# Patient Record
Sex: MALE | Race: WHITE | NOT HISPANIC OR LATINO | Employment: FULL TIME | URBAN - METROPOLITAN AREA
[De-identification: names, ages, dates, MRNs, and addresses within clinical notes are randomized per-mention and may not be internally consistent; named-entity substitution may affect disease eponyms.]

---

## 2021-12-29 ENCOUNTER — OFFICE VISIT (OUTPATIENT)
Dept: INTERNAL MEDICINE CLINIC | Facility: CLINIC | Age: 36
End: 2021-12-29
Payer: COMMERCIAL

## 2021-12-29 ENCOUNTER — APPOINTMENT (OUTPATIENT)
Dept: LAB | Facility: CLINIC | Age: 36
End: 2021-12-29
Payer: COMMERCIAL

## 2021-12-29 VITALS
OXYGEN SATURATION: 95 % | TEMPERATURE: 97.8 F | HEART RATE: 99 BPM | DIASTOLIC BLOOD PRESSURE: 60 MMHG | HEIGHT: 72 IN | BODY MASS INDEX: 18.53 KG/M2 | WEIGHT: 136.8 LBS | SYSTOLIC BLOOD PRESSURE: 110 MMHG

## 2021-12-29 DIAGNOSIS — Z11.4 ENCOUNTER FOR SCREENING FOR HIV: ICD-10-CM

## 2021-12-29 DIAGNOSIS — M25.511 RIGHT SHOULDER PAIN, UNSPECIFIED CHRONICITY: ICD-10-CM

## 2021-12-29 DIAGNOSIS — Z11.59 ENCOUNTER FOR HEPATITIS C SCREENING TEST FOR LOW RISK PATIENT: ICD-10-CM

## 2021-12-29 DIAGNOSIS — Z13.6 ENCOUNTER FOR LIPID SCREENING FOR CARDIOVASCULAR DISEASE: ICD-10-CM

## 2021-12-29 DIAGNOSIS — E55.9 VITAMIN D DEFICIENCY: ICD-10-CM

## 2021-12-29 DIAGNOSIS — R51.9 NONINTRACTABLE HEADACHE, UNSPECIFIED CHRONICITY PATTERN, UNSPECIFIED HEADACHE TYPE: ICD-10-CM

## 2021-12-29 DIAGNOSIS — Z13.220 ENCOUNTER FOR LIPID SCREENING FOR CARDIOVASCULAR DISEASE: ICD-10-CM

## 2021-12-29 DIAGNOSIS — Z00.00 WELL ADULT ON ROUTINE HEALTH CHECK: ICD-10-CM

## 2021-12-29 DIAGNOSIS — Z00.00 WELL ADULT ON ROUTINE HEALTH CHECK: Primary | ICD-10-CM

## 2021-12-29 LAB
ALBUMIN SERPL BCP-MCNC: 4.5 G/DL (ref 3.5–5)
ALP SERPL-CCNC: 79 U/L (ref 46–116)
ALT SERPL W P-5'-P-CCNC: 25 U/L (ref 12–78)
ANION GAP SERPL CALCULATED.3IONS-SCNC: 7 MMOL/L (ref 4–13)
AST SERPL W P-5'-P-CCNC: 24 U/L (ref 5–45)
BASOPHILS # BLD AUTO: 0.04 THOUSANDS/ΜL (ref 0–0.1)
BASOPHILS NFR BLD AUTO: 1 % (ref 0–1)
BILIRUB SERPL-MCNC: 0.79 MG/DL (ref 0.2–1)
BUN SERPL-MCNC: 8 MG/DL (ref 5–25)
CALCIUM SERPL-MCNC: 9.7 MG/DL (ref 8.3–10.1)
CHLORIDE SERPL-SCNC: 100 MMOL/L (ref 100–108)
CHOLEST SERPL-MCNC: 202 MG/DL
CO2 SERPL-SCNC: 30 MMOL/L (ref 21–32)
CREAT SERPL-MCNC: 0.94 MG/DL (ref 0.6–1.3)
EOSINOPHIL # BLD AUTO: 0.18 THOUSAND/ΜL (ref 0–0.61)
EOSINOPHIL NFR BLD AUTO: 3 % (ref 0–6)
ERYTHROCYTE [DISTWIDTH] IN BLOOD BY AUTOMATED COUNT: 11.2 % (ref 11.6–15.1)
GFR SERPL CREATININE-BSD FRML MDRD: 103 ML/MIN/1.73SQ M
GLUCOSE SERPL-MCNC: 126 MG/DL (ref 65–140)
HCT VFR BLD AUTO: 44.6 % (ref 36.5–49.3)
HDLC SERPL-MCNC: 80 MG/DL
HGB BLD-MCNC: 15.7 G/DL (ref 12–17)
IMM GRANULOCYTES # BLD AUTO: 0.02 THOUSAND/UL (ref 0–0.2)
IMM GRANULOCYTES NFR BLD AUTO: 0 % (ref 0–2)
LDLC SERPL CALC-MCNC: 109 MG/DL (ref 0–100)
LYMPHOCYTES # BLD AUTO: 1.37 THOUSANDS/ΜL (ref 0.6–4.47)
LYMPHOCYTES NFR BLD AUTO: 21 % (ref 14–44)
MCH RBC QN AUTO: 32.8 PG (ref 26.8–34.3)
MCHC RBC AUTO-ENTMCNC: 35.2 G/DL (ref 31.4–37.4)
MCV RBC AUTO: 93 FL (ref 82–98)
MONOCYTES # BLD AUTO: 0.38 THOUSAND/ΜL (ref 0.17–1.22)
MONOCYTES NFR BLD AUTO: 6 % (ref 4–12)
NEUTROPHILS # BLD AUTO: 4.5 THOUSANDS/ΜL (ref 1.85–7.62)
NEUTS SEG NFR BLD AUTO: 69 % (ref 43–75)
NONHDLC SERPL-MCNC: 122 MG/DL
NRBC BLD AUTO-RTO: 0 /100 WBCS
PLATELET # BLD AUTO: 287 THOUSANDS/UL (ref 149–390)
PMV BLD AUTO: 10.9 FL (ref 8.9–12.7)
POTASSIUM SERPL-SCNC: 3.8 MMOL/L (ref 3.5–5.3)
PROT SERPL-MCNC: 8.3 G/DL (ref 6.4–8.2)
RBC # BLD AUTO: 4.78 MILLION/UL (ref 3.88–5.62)
SODIUM SERPL-SCNC: 137 MMOL/L (ref 136–145)
TRIGL SERPL-MCNC: 63 MG/DL
WBC # BLD AUTO: 6.49 THOUSAND/UL (ref 4.31–10.16)

## 2021-12-29 PROCEDURE — 87389 HIV-1 AG W/HIV-1&-2 AB AG IA: CPT

## 2021-12-29 PROCEDURE — 36415 COLL VENOUS BLD VENIPUNCTURE: CPT

## 2021-12-29 PROCEDURE — 80053 COMPREHEN METABOLIC PANEL: CPT

## 2021-12-29 PROCEDURE — 80061 LIPID PANEL: CPT

## 2021-12-29 PROCEDURE — 82306 VITAMIN D 25 HYDROXY: CPT

## 2021-12-29 PROCEDURE — 99204 OFFICE O/P NEW MOD 45 MIN: CPT

## 2021-12-29 PROCEDURE — 85025 COMPLETE CBC W/AUTO DIFF WBC: CPT

## 2021-12-29 PROCEDURE — 86803 HEPATITIS C AB TEST: CPT

## 2021-12-29 NOTE — PROGRESS NOTES
INTERNAL MEDICINE INITIAL OFFICE VISIT  [facility] - [facility]    NAME: Navdeep Soto  AGE: 39 y o  SEX: male  : 1985     DATE: 1/3/2022     Assessment and Plan:     1  Well adult on routine health check  Return in 1 year for physical  - CBC and differential; Future  - Lipid panel; Future  - Comprehensive metabolic panel; Future    2  Encounter for lipid screening for cardiovascular disease  - Lipid panel; Future    3  Encounter for hepatitis C screening test for low risk patient  - Hepatitis C antibody; Future    4  Encounter for screening for HIV  - HIV 1/2 Antigen/Antibody (4th Generation) w Reflex [facility]; Future    5  Right shoulder pain, unspecified chronicity  - Ambulatory referral to Orthopedic Surgery; Future    6  Vitamin D deficiency  - Vitamin D 25 hydroxy; Future    Return in about 1 year (around 2022), or if symptoms worsen or fail to improve, for Annual physical      Chief Complaint:     Chief Complaint   Patient presents with    Establish Care      History of Present Illness:     Sukhdev Monroy is a 80-year-old male who presents to the office today to establish care  He recently had a COVID infection and needs clearance to go back to work  His only complaint related to his COVID infection is having some brain fog and headaches  He also states he has been fatigued  I counseled him that some symptoms may linger for a few weeks  Sukhdev Monroy otherwise denies any significant medical history or surgical history  He denies any psychiatric history  He denies any concerns regarding depression or anxiety  He is a current smoker  He is not vaccinated for COVID and I did  him on getting vaccinated in the future  I will have him get blood work prior to leaving the office today and follow up with him with any abnormal results  And recommend a routine annual physical 1 year unless he needs anything before that      The following portions of the patient's history were reviewed and updated as appropriate: allergies, current medications, past family history, past medical history, past social history, past surgical history and problem list      Review of Systems:     Review of Systems   Constitutional: Positive for fatigue  Negative for chills and fever  HENT: Negative for congestion, postnasal drip and sore throat  Respiratory: Negative for cough and shortness of breath  Cardiovascular: Negative for chest pain and palpitations  Gastrointestinal: Negative for abdominal pain, constipation, diarrhea, nausea and vomiting  Genitourinary: Negative for dysuria, frequency, hematuria and testicular pain  Musculoskeletal: Negative for arthralgias (Right shoulder)  Skin: Negative for rash and wound  Neurological: Positive for headaches  Negative for seizures, syncope and weakness  Psychiatric/Behavioral: Negative for sleep disturbance and suicidal ideas  Past Medical History:   History reviewed  No pertinent past medical history  Past Surgical History:   History reviewed  No pertinent surgical history       Social History:     Social History     Socioeconomic History    Marital status: Single     Spouse name: None    Number of children: None    Years of education: None    Highest education level: None   Occupational History    None   Tobacco Use    Smoking status: Current Every Day Smoker    Smokeless tobacco: Current User    Tobacco comment: VAPES   Substance and Sexual Activity    Alcohol use: Yes     Comment: SOCALLY    Drug use: Never    Sexual activity: None   Other Topics Concern    None   Social History Narrative    None     Social Determinants of Health     Financial Resource Strain: Not on file   Food Insecurity: Not on file   Transportation Needs: Not on file   Physical Activity: Not on file   Stress: Not on file   Social Connections: Not on file   Intimate Partner Violence: Not on file   Housing Stability: Not on file         Family History:   History reviewed  No pertinent family history  Current Medications:   No current outpatient medications on file  Allergies: Allergies   Allergen Reactions    Ventolin [Albuterol] Other (See Comments)     CLOSED AIRWAYS, HAD TO GO INTO OXYGEN TENT        Physical Exam:     /60   Pulse 99   Temp 97 8 °F (36 6 °C)   Ht 5' 11 5" (1 816 m)   Wt 62 1 kg (136 lb 12 8 oz)   SpO2 95%   BMI 18 81 kg/m²     Physical Exam  Vitals and nursing note reviewed  Constitutional:       General: He is not in acute distress  Appearance: He is well-developed and normal weight  HENT:      Head: Normocephalic and atraumatic  Right Ear: Tympanic membrane normal       Left Ear: Tympanic membrane normal       Nose: Nose normal       Mouth/Throat:      Mouth: Mucous membranes are moist       Pharynx: Oropharynx is clear  Eyes:      Conjunctiva/sclera: Conjunctivae normal       Pupils: Pupils are equal, round, and reactive to light  Cardiovascular:      Rate and Rhythm: Normal rate and regular rhythm  Heart sounds: No murmur heard  Pulmonary:      Effort: Pulmonary effort is normal  No respiratory distress  Breath sounds: Normal breath sounds  Abdominal:      General: Bowel sounds are normal       Palpations: Abdomen is soft  Tenderness: There is no abdominal tenderness  Musculoskeletal:         General: Normal range of motion  Right shoulder: Crepitus present  Decreased strength (Mild)  Cervical back: Neck supple  Lymphadenopathy:      Cervical: No cervical adenopathy  Skin:     General: Skin is warm and dry  Capillary Refill: Capillary refill takes less than 2 seconds  Neurological:      General: No focal deficit present  Mental Status: He is alert and oriented to person, place, and time  Psychiatric:         Mood and Affect: Mood normal          Behavior: Behavior normal          Thought Content:  Thought content normal          Judgment: Judgment normal            Data:     Laboratory Results: I have personally reviewed the pertinent laboratory results/reports   Radiology/Other Diagnostic Testing Results: I have personally reviewed pertinent reports  I spent 30 minutes with this patient      [address] [place] [address]  [facility] [address]

## 2021-12-29 NOTE — PATIENT INSTRUCTIONS
Flonase 1 spray each nostril at bedtime  Zyrtec, Allegra, or Claritin (over the counter) 1 tablet daily  Magnesium  (Mag Ox) supplement 1 tab OTC for headaches  Stay hydrated      Long COVID   WHAT YOU NEED TO KNOW:   José Miguel Reeves is a term used to describe ongoing effects of COVID-19 infection  Signs and symptoms are considered long COVID if they begin or continue at least 4 weeks after the infection  Experts believe the immune system in some people overreacts to the virus that causes COVID-19  The immune system attacks the virus, but it also attacks healthy nerves, blood vessels, and organs  It is not yet known how long symptoms could continue  Information about COVID-19 and the long-term effects it causes is still being learned  Anyone who had COVID-19 can develop long COVID, even if symptoms were mild or never developed at all  Long COVID may also be called post-COVID syndrome or post-acute sequelae of SARS-CoV-2 (PASC)  DISCHARGE INSTRUCTIONS:   Call your local emergency number (911 in the [address] [place] [address],[address]) if:   · You have any of the following signs of a stroke:      ? Numbness or drooping on one side of your face     ? Weakness in an arm or leg    ? Confusion or difficulty speaking    ? Dizziness, a severe headache, or vision loss       · You have any of the following signs of a heart attack:      ? Squeezing, pressure, or pain in your chest    ? You may  also have any of the following:     § Discomfort or pain in your back, neck, jaw, stomach, or arm    § Shortness of breath    § Nausea or vomiting    § Lightheadedness or a sudden cold sweat    · You have a seizure (and do not have a known seizure disorder)  · You are confused  · You have sudden trouble breathing, or you cough up blood  · Your legs start to feel numb, or you have trouble moving them  Return to the emergency department if:   · Your arm or leg feels warm, tender, and painful  It may look swollen and red      · You feel short of breath even at rest     · You have newly weak muscles  · You have vision or hearing changes  · You have a fever of 103°F (39 4°C) or higher  Call your doctor or specialist if:   · You have a low fever that continues even with medicine  · You have questions or concerns about your condition or care  Medicines: You may need any of the following:  · NSAIDs , such as ibuprofen, help decrease swelling, pain, and fever  This medicine is available with or without a doctor's order  NSAIDs can cause stomach bleeding or kidney problems in certain people  If you take blood thinner medicine, always ask your healthcare provider if NSAIDs are safe for you  Always read the medicine label and follow directions  · Acetaminophen  decreases pain and fever  It is available without a doctor's order  Ask how much to take and how often to take it  Follow directions  Read the labels of all other medicines you are using to see if they also contain acetaminophen, or ask your doctor or pharmacist  Acetaminophen can cause liver damage if not taken correctly  Do not use more than 4 grams (4,000 milligrams) total of acetaminophen in one day  · Steroids  help lower inflammation  · Antibiotics  help prevent or treat a bacterial infection  · Take your medicine as directed  Contact your healthcare provider if you think your medicine is not helping or if you have side effects  Tell him or her if you are allergic to any medicine  Keep a list of the medicines, vitamins, and herbs you take  Include the amounts, and when and why you take them  Bring the list or the pill bottles to follow-up visits  Carry your medicine list with you in case of an emergency  Manage your symptoms:   · Rest as needed  Take naps and change your schedule to fit your energy level  You may need to take 5 to 10 minute rest periods every hour or more  Try to go to bed and wake up at the same times every day  · Manage any other health conditions you have    Some health conditions can cause symptoms that are similar to COVID-19 or that can make other symptoms worse  For example, an asthma or COPD exacerbation can cause breathing problems that may worsen long COVID breathing problems  · Eat a variety of healthy foods  Healthy foods include vegetables, fruit, lean meat, poultry, fish, low-fat dairy, nuts, whole-grain breads, and cooked beans  Talk to your healthcare provider if you have a loss of appetite or any problems eating  Your provider or a dietitian can help you create healthy meals and snacks  · Be as physically active as you are able to be  Light physical activity, such as walking, can help with many symptoms  For example, activity can help your lungs work better, improve your sleep, and relieve depression or anxiety  Start with light activity most days of the week  You can work up to more activity as you feel better  Talk to your healthcare provider if you do not feel able to get any physical activity  Your provider may recommend that you work with a physical or occupational therapist  A physical therapist can help you work up to more activity  An occupational therapist can help you plan activities around your symptoms  · Keep a record of your activities and symptoms each day  This record will help you learn when you have the most energy  You will also be able to follow your progress  Bring this record with you to your follow-up visits  · Get support  Ask your healthcare provider about support groups in your area  A support group is a place to talk with others who also have long COVID  You can talk about your feelings and get advice for managing symptoms  Talk therapy with a mental health professional can also help you and your family members manage the stress of long COVID  · Limit or do not drink alcohol  Ask your healthcare provider if it is okay for you to drink any alcohol   He or she can help you set limits for the number of drinks you have in 24 hours and in a week  A drink of alcohol is 12 ounces of beer, 5 ounces of wine, or 1½ ounces of liquor  · Do not smoke  Nicotine and other chemicals in cigarettes can damage your heart and lungs  Ask your healthcare provider for information if you currently smoke and need help to quit  E-cigarettes or smokeless tobacco still contain nicotine  Talk to your healthcare provider before you use these products  Follow up with your doctor as directed: You may need to come in for ongoing tests or treatment  Your doctor may also refer you to a specialist  The specialist will depend on your symptoms and the affected areas of your body  Write down your questions so you remember to ask them during your visits  © Copyright [employer] 2021 Information is for End User's use only and may not be sold, redistributed or otherwise used for commercial purposes  All illustrations and images included in CareNotes® are the copyrighted property of A D A M , Inc  or [employer]   The above information is an  only  It is not intended as medical advice for individual conditions or treatments  Talk to your doctor, nurse or pharmacist before following any medical regimen to see if it is safe and effective for you  Upper Respiratory Infection   WHAT YOU NEED TO KNOW:   An upper respiratory infection is also called a cold  It can affect your nose, throat, ears, and sinuses  Cold symptoms are usually worst for the first 3 to 5 days  Most people get better in 7 to 14 days  You may continue to cough for 2 to 3 weeks  Colds are caused by viruses and do not get better with antibiotics  DISCHARGE INSTRUCTIONS:   Call your local emergency number (911 in the [address] [place] [address],[address]) if:   · You have chest pain or trouble breathing  Return to the emergency department if:   · You have a fever over 102ºF (39ºC)  Call your doctor if:   · You have a low fever      · Your sore throat gets worse or you see white or yellow spots in your throat  · Your symptoms get worse after 3 to 5 days or are not better in 14 days  · You have a rash anywhere on your skin  · You have large, tender lumps in your neck  · You have thick, green, or yellow drainage from your nose  · You cough up thick yellow, green, or bloody mucus  · You have a bad earache  · You have questions or concerns about your condition or care  Medicines: You may need any of the following:  · Decongestants  help reduce nasal congestion and help you breathe more easily  If you take decongestant pills, they may make you feel restless or cause problems with your sleep  Do not use decongestant sprays for more than a few days  · Cough suppressants  help reduce coughing  Ask your healthcare provider which type of cough medicine is best for you  · NSAIDs , such as ibuprofen, help decrease swelling, pain, and fever  NSAIDs can cause stomach bleeding or kidney problems in certain people  If you take blood thinner medicine, always ask your healthcare provider if NSAIDs are safe for you  Always read the medicine label and follow directions  · Acetaminophen  decreases pain and fever  It is available without a doctor's order  Ask how much to take and how often to take it  Follow directions  Read the labels of all other medicines you are using to see if they also contain acetaminophen, or ask your doctor or pharmacist  Acetaminophen can cause liver damage if not taken correctly  Do not use more than 4 grams (4,000 milligrams) total of acetaminophen in one day  · Take your medicine as directed  Contact your healthcare provider if you think your medicine is not helping or if you have side effects  Tell him or her if you are allergic to any medicine  Keep a list of the medicines, vitamins, and herbs you take  Include the amounts, and when and why you take them  Bring the list or the pill bottles to follow-up visits   Carry your medicine list with you in case of an emergency  Self-care:   · Rest as much as possible  Slowly start to do more each day  · Drink more liquids as directed  Liquids will help thin and loosen mucus so you can cough it up  Liquids will also help prevent dehydration  Liquids that help prevent dehydration include water, fruit juice, and broth  Do not drink liquids that contain caffeine  Caffeine can increase your risk for dehydration  Ask your healthcare provider how much liquid to drink each day  · Soothe a sore throat  Gargle with warm salt water  Make salt water by dissolving ¼ teaspoon salt in 1 cup warm water  You may also suck on hard candy or throat lozenges  You may use a sore throat spray  · Use a humidifier or vaporizer  Use a cool mist humidifier or a vaporizer to increase air moisture in your home  This may make it easier for you to breathe and help decrease your cough  · Use saline nasal drops as directed  These help relieve congestion  · Apply petroleum-based jelly around the outside of your nostrils  This can decrease irritation from blowing your nose  · Do not smoke  Nicotine and other chemicals in cigarettes and cigars can make your symptoms worse  They can also cause infections such as bronchitis or pneumonia  Ask your healthcare provider for information if you currently smoke and need help to quit  E-cigarettes or smokeless tobacco still contain nicotine  Talk to your healthcare provider before you use these products  Prevent a cold:   · Wash your hands often  Use soap and water every time you wash your hands  Rub your soapy hands together, lacing your fingers  Use the fingers of one hand to scrub under the nails of the other hand  Wash for at least 20 seconds  Rinse with warm, running water for several seconds  Then dry your hands  Use germ-killing gel if soap and water are not available  Do not touch your eyes or mouth without washing your hands first          · Cover a sneeze or cough  Use a tissue that covers your mouth and nose  Put the used tissue in the trash right away  Use the bend of your arm if a tissue is not available  Wash your hands well with soap and water or use a hand   Do not stand close to anyone who is sneezing or coughing  · Try to stay away from others while you are sick  This is especially important during the first 2 to 3 days when the virus is more easily spread  Wait until a fever, cough, or other symptoms are gone before you return to work or other regular activities  · Do not share items while you are sick  This includes food, drinks, eating utensils, and dishes  Follow up with your doctor as directed:  Write down your questions so you remember to ask them during your visits  © Copyright [employer] 2021 Information is for End User's use only and may not be sold, redistributed or otherwise used for commercial purposes  All illustrations and images included in CareNotes® are the copyrighted property of A D A M , Inc  or [facility] Franklyn Hernández   The above information is an  only  It is not intended as medical advice for individual conditions or treatments  Talk to your doctor, nurse or pharmacist before following any medical regimen to see if it is safe and effective for you

## 2021-12-29 NOTE — LETTER
January 3, 2022    Patient: Harvey Feliz  YOB: 1985  Date of Last Encounter: Visit date not found      To whom it may concern:     Harvey Feliz has tested positive for COVID-19 (Coronavirus) on 12/15/21  He may return to work on 01/03/2022  He should be exempt from testing for 90 days from his last positive test as per the CDC guidelines      Sincerely,         DUDLEY Choi

## 2021-12-30 ENCOUNTER — TELEPHONE (OUTPATIENT)
Dept: INTERNAL MEDICINE CLINIC | Facility: CLINIC | Age: 36
End: 2021-12-30

## 2021-12-30 LAB
25(OH)D3 SERPL-MCNC: 25.4 NG/ML (ref 30–100)
HCV AB SER QL: NORMAL
HIV 1+2 AB+HIV1 P24 AG SERPL QL IA: NORMAL

## 2022-12-23 ENCOUNTER — RA CDI HCC (OUTPATIENT)
Dept: OTHER | Facility: HOSPITAL | Age: 37
End: 2022-12-23

## 2022-12-23 NOTE — PROGRESS NOTES
[facility]  coding opportunities       Chart reviewed, no opportunity found: CHART REVIEWED, NO OPPORTUNITY FOUND        Patients Insurance        Commercial Insurance: [employer] Holman

## 2023-07-13 ENCOUNTER — TELEPHONE (OUTPATIENT)
Age: 38
End: 2023-07-13

## 2023-12-27 ENCOUNTER — OFFICE VISIT (OUTPATIENT)
Dept: GASTROENTEROLOGY | Facility: CLINIC | Age: 38
End: 2023-12-27
Payer: COMMERCIAL

## 2023-12-27 VITALS
HEART RATE: 77 BPM | DIASTOLIC BLOOD PRESSURE: 80 MMHG | WEIGHT: 134.8 LBS | SYSTOLIC BLOOD PRESSURE: 118 MMHG | OXYGEN SATURATION: 99 % | HEIGHT: 71 IN | BODY MASS INDEX: 18.87 KG/M2

## 2023-12-27 DIAGNOSIS — R13.10 DYSPHAGIA, UNSPECIFIED TYPE: Primary | ICD-10-CM

## 2023-12-27 PROCEDURE — 99203 OFFICE O/P NEW LOW 30 MIN: CPT | Performed by: PHYSICIAN ASSISTANT

## 2023-12-27 NOTE — H&P (VIEW-ONLY)
No results found.Answers submitted by the patient for this visit:  Abdominal Pain Questionnaire (Submitted on 12/20/2023)  Chief Complaint: Abdominal pain  Chronicity: chronic  Onset: more than 1 year ago  Onset quality: gradual  Frequency: intermittently  Episode duration: 6 Hours  Progression since onset: gradually worsening  Pain location: epigastric region  Pain quality: a sensation of fullness  Radiates to: does not radiate  anorexia: Yes  arthralgias: No  belching: Yes  constipation: No  diarrhea: No  dysuria: No  fever: No  flatus: No  frequency: No  headaches: No  hematochezia: No  hematuria: No  melena: No  myalgias: No  nausea: No  weight loss: No  vomiting: Yes  Aggravated by: eating  Relieved by: nothing  [facility] - Outpatient Consultation  Cruzito Moon 38 y.o. male MRN: [id number]  Encounter: [id number]          ASSESSMENT AND PLAN:      1. Dysphagia, unspecified type  -Will plan EGD with biopsy and dilation.     -If EGD is negative will proceed with esophageal motility testing.  ______________________________________________________________________    HPI:    38-year-old male with no significant past medical history presents to the GI clinic today for consultation.  Patient reports that for several years he has been experiencing issues with swallowing.  He reports that these episodes have become more pronounced over the last several weeks.  Patient reports that he feels as if food sticks in the upper part of his esophagus from time to time.  He reports that this has happened with arredondo and eggs as well as meatloaf.  He reports that when he swallows the food just sticks in the upper part of his esophagus.  He reports that he produces significant amount of saliva and can generally expel the saliva but cannot expel the food.  He denies any significant episodes of regurgitation.  He reports that when he does swallow water during these episodes sometimes it helps and  sometimes it does not.  He reports that he has drastically had to modify his diet and his lifestyle because of this.  He reports that sometimes the food can be sitting there for an hour up to 4 hours.  He reports that his weight has been stable but would like to gain weight.  He denies any nausea or vomiting.  He denies any heartburn.  He denies any chronic cough or clearing of his throat.  He does report a significant family history of esophageal issues.  He has never had an EGD in the past.  He is not maintained on any medication.  He denies any over-the-counter medication as well.    REVIEW OF SYSTEMS:    CONSTITUTIONAL: Denies any fever, chills, rigors, and weight loss.  HEENT: No earache or tinnitus. Denies hearing loss or visual disturbances.  CARDIOVASCULAR: No chest pain or palpitations.   RESPIRATORY: Denies any cough, hemoptysis, shortness of breath or dyspnea on exertion.  GASTROINTESTINAL: As noted in the History of Present Illness.   GENITOURINARY: No problems with urination. Denies any hematuria or dysuria.  NEUROLOGIC: No dizziness or vertigo, denies headaches.   MUSCULOSKELETAL: Denies any muscle or joint pain.   SKIN: Denies skin rashes or itching.   ENDOCRINE: Denies excessive thirst. Denies intolerance to heat or cold.  PSYCHOSOCIAL: Denies depression or anxiety. Denies any recent memory loss.       Historical Information   History reviewed. No pertinent past medical history.  Past Surgical History:   Procedure Laterality Date    ANKLE SURGERY Right 2006     Social History   Social History     Substance and Sexual Activity   Alcohol Use Yes    Comment: SOCALLY     Social History     Substance and Sexual Activity   Drug Use Never     Social History     Tobacco Use   Smoking Status Former    Current packs/day: 0.00    Types: Cigarettes    Quit date:     Years since quittin.9   Smokeless Tobacco Current   Tobacco Comments    VAPES     Family History   Problem Relation Age of Onset    GI  "problems Mother     BERNARDINO disease Maternal Uncle        Meds/Allergies     No current outpatient medications on file.    Allergies   Allergen Reactions    Albuterol Other (See Comments)     CLOSED AIRWAYS, HAD TO GO INTO OXYGEN TENT           Objective     Blood pressure 118/80, pulse 77, height 5' 11\" (1.803 m), weight 61.1 kg (134 lb 12.8 oz), SpO2 99%. Body mass index is 18.8 kg/m².        PHYSICAL EXAM:      General Appearance:   Alert, cooperative, no distress   HEENT:   Normocephalic, atraumatic, anicteric.     Neck:  Supple, symmetrical, trachea midline   Lungs:   Clear to auscultation bilaterally; no rales, rhonchi or wheezing; respirations unlabored    Heart::   Regular rate and rhythm; no murmur, rub, or gallop.   Abdomen:   Soft, non-tender, non-distended; normal bowel sounds; no masses, no organomegaly    Genitalia:   Deferred    Rectal:   Deferred    Extremities:  No cyanosis, clubbing or edema    Pulses:  2+ and symmetric    Skin:  No jaundice, rashes, or lesions    Lymph nodes:  No palpable cervical lymphadenopathy        Lab Results:   No visits with results within 1 Day(s) from this visit.   Latest known visit with results is:   Appointment on 12/29/2021   Component Date Value    Hepatitis C Ab 12/29/2021 Non-reactive     HIV-1/HIV-2 Ab 12/29/2021 Non-Reactive     WBC 12/29/2021 6.49     RBC 12/29/2021 4.78     Hemoglobin 12/29/2021 15.7     Hematocrit 12/29/2021 44.6     MCV 12/29/2021 93     MCH 12/29/2021 32.8     MCHC 12/29/2021 35.2     RDW 12/29/2021 11.2 (L)     MPV 12/29/2021 10.9     Platelets 12/29/2021 287     nRBC 12/29/2021 0     Neutrophils Relative 12/29/2021 69     Immat GRANS % 12/29/2021 0     Lymphocytes Relative 12/29/2021 21     Monocytes Relative 12/29/2021 6     Eosinophils Relative 12/29/2021 3     Basophils Relative 12/29/2021 1     Neutrophils Absolute 12/29/2021 4.50     Immature Grans Absolute 12/29/2021 0.02     Lymphocytes Absolute 12/29/2021 1.37     Monocytes Absolute " 12/29/2021 0.38     Eosinophils Absolute 12/29/2021 0.18     Basophils Absolute 12/29/2021 0.04     Cholesterol 12/29/2021 202 (H)     Triglycerides 12/29/2021 63     HDL, Direct 12/29/2021 80     LDL Calculated 12/29/2021 109 (H)     Non-HDL-Chol (CHOL-HDL) 12/29/2021 122     Vit D, 25-Hydroxy 12/29/2021 25.4 (L)     Sodium 12/29/2021 137     Potassium 12/29/2021 3.8     Chloride 12/29/2021 100     CO2 12/29/2021 30     ANION GAP 12/29/2021 7     BUN 12/29/2021 8     Creatinine 12/29/2021 0.94     Glucose 12/29/2021 126     Calcium 12/29/2021 9.7     AST 12/29/2021 24     ALT 12/29/2021 25     Alkaline Phosphatase 12/29/2021 79     Total Protein 12/29/2021 8.3 (H)     Albumin 12/29/2021 4.5     Total Bilirubin 12/29/2021 0.79     eGFR 12/29/2021 103          Radiology Results:   No results found.

## 2023-12-27 NOTE — PATIENT INSTRUCTIONS
Scheduled date of EGD(as of today):1/29/24  Physician performing EGD:Jefe  Location of EGD:[facility]  Instructions reviewed with patient by:Rocco mccoy  Clearances:  none      Upper Endoscopy   AMBULATORY CARE:   What you need to know about an upper endoscopy:  An upper endoscopy is also called an upper gastrointestinal (GI) endoscopy, or an esophagogastroduodenoscopy (EGD). A scope (thin, flexible tube with a light and camera) is used to examine the walls of your upper digestive tract. The upper digestive tract includes the esophagus, stomach, and duodenum (first part of the small intestine). An upper endoscopy is used to look for problems, such as bleeding, polyps, ulcers, or infection.        How to prepare for an upper endoscopy:  Your healthcare provider will talk to you about how to prepare for your procedure. You may be told not eat or drink anything except water for 6 to 12 hours before the procedure. Your provider will tell you which medicines to take or not take on the day of your procedure. Arrange to have someone drive you home.  What will happen during an upper endoscopy:   You will be given medicine through your IV to help you relax and make you drowsy. You will also be given medicine to numb your throat. You may need to wear a plastic mouthpiece to help hold your mouth open and protect your teeth and tongue. Your provider will gently insert the endoscope through your mouth and down into your throat. You may be asked to swallow to help move the scope. You may feel pressure in your throat, but you should not feel pain. The endoscope does not restrict your breathing.    Your provider will watch the scope on a monitor and take pictures with the scope. Your provider may gently inject air to make it easier to see your digestive tract clearly. Your provider may take tissue samples and send them to a lab for tests. Foreign objects, tumors, or polyps that may be blocking your digestive tract may be removed. Your  provider may also insert tools with the scope to treat bleeding or place a stent (tube).    What to expect after an upper endoscopy:  You may feel bloated, gassy, or have some abdominal discomfort. Your throat may be sore for 24 to 36 hours after the procedure. You may burp or pass gas from air that is still inside your body after your procedure. You may need to take short walks to help move the gas out. Eat small meals, if you feel bloated. Do not drive or make important decisions until the day after your procedure.  Risks of an upper endoscopy:  Your esophagus, stomach, or duodenum may be punctured or torn during the procedure. This is because of increased pressure as the scope and air are passing through. You may bleed more than expected or get an infection. You may have a slow or irregular heartbeat, or low blood pressure. This can cause sweating and fainting. Fluid may enter your lungs and you may have trouble breathing. These problems can be life-threatening.  Call 911 if:   You have sudden chest pain or trouble breathing.      Seek care immediately if:   You feel dizzy or faint.    You have trouble swallowing.    You have severe throat pain.    Your bowel movements are very dark or black.    Your abdomen is hard and firm and you have severe pain.    You vomit blood.    Contact your healthcare provider if:   You feel full or bloated and cannot burp or pass gas.    You have not had a bowel movement for 3 days after your procedure.    You have neck pain.    You have a fever or chills.    You have nausea or are vomiting.    You have a rash or hives.    You have questions or concerns about your endoscopy.    Relieve a sore throat:  Suck on throat lozenges or crushed ice. Gargle with a small amount of warm salt water. Mix 1 teaspoon of salt and 1 cup of warm water to make salt water.  Relieve gas and discomfort from bloating:  Lie on your right side with a heating pad on your abdomen. Take short walks to help pass  gas. Eat small meals until bloating is relieved.  Rest after your procedure:  You have been given medicine to relax you. Do not  drive or make important decisions until the day after your procedure. Return to your normal activity as directed. You can usually return to work the day after your procedure.  Follow up with your healthcare provider as directed:  Write down your questions so you remember to ask them during your visits.  © Copyright [employer] 2023 Information is for End User's use only and may not be sold, redistributed or otherwise used for commercial purposes.  The above information is an  only. It is not intended as medical advice for individual conditions or treatments. Talk to your doctor, nurse or pharmacist before following any medical regimen to see if it is safe and effective for you.

## 2023-12-27 NOTE — PROGRESS NOTES
No results found.Answers submitted by the patient for this visit:  Abdominal Pain Questionnaire (Submitted on 12/20/2023)  Chief Complaint: Abdominal pain  Chronicity: chronic  Onset: more than 1 year ago  Onset quality: gradual  Frequency: intermittently  Episode duration: 6 Hours  Progression since onset: gradually worsening  Pain location: epigastric region  Pain quality: a sensation of fullness  Radiates to: does not radiate  anorexia: Yes  arthralgias: No  belching: Yes  constipation: No  diarrhea: No  dysuria: No  fever: No  flatus: No  frequency: No  headaches: No  hematochezia: No  hematuria: No  melena: No  myalgias: No  nausea: No  weight loss: No  vomiting: Yes  Aggravated by: eating  Relieved by: nothing  [facility] - Outpatient Consultation  Cruzito Moon 38 y.o. male MRN: [id number]  Encounter: [id number]          ASSESSMENT AND PLAN:      1. Dysphagia, unspecified type  -Will plan EGD with biopsy and dilation.     -If EGD is negative will proceed with esophageal motility testing.  ______________________________________________________________________    HPI:    38-year-old male with no significant past medical history presents to the GI clinic today for consultation.  Patient reports that for several years he has been experiencing issues with swallowing.  He reports that these episodes have become more pronounced over the last several weeks.  Patient reports that he feels as if food sticks in the upper part of his esophagus from time to time.  He reports that this has happened with arredondo and eggs as well as meatloaf.  He reports that when he swallows the food just sticks in the upper part of his esophagus.  He reports that he produces significant amount of saliva and can generally expel the saliva but cannot expel the food.  He denies any significant episodes of regurgitation.  He reports that when he does swallow water during these episodes sometimes it helps and  sometimes it does not.  He reports that he has drastically had to modify his diet and his lifestyle because of this.  He reports that sometimes the food can be sitting there for an hour up to 4 hours.  He reports that his weight has been stable but would like to gain weight.  He denies any nausea or vomiting.  He denies any heartburn.  He denies any chronic cough or clearing of his throat.  He does report a significant family history of esophageal issues.  He has never had an EGD in the past.  He is not maintained on any medication.  He denies any over-the-counter medication as well.    REVIEW OF SYSTEMS:    CONSTITUTIONAL: Denies any fever, chills, rigors, and weight loss.  HEENT: No earache or tinnitus. Denies hearing loss or visual disturbances.  CARDIOVASCULAR: No chest pain or palpitations.   RESPIRATORY: Denies any cough, hemoptysis, shortness of breath or dyspnea on exertion.  GASTROINTESTINAL: As noted in the History of Present Illness.   GENITOURINARY: No problems with urination. Denies any hematuria or dysuria.  NEUROLOGIC: No dizziness or vertigo, denies headaches.   MUSCULOSKELETAL: Denies any muscle or joint pain.   SKIN: Denies skin rashes or itching.   ENDOCRINE: Denies excessive thirst. Denies intolerance to heat or cold.  PSYCHOSOCIAL: Denies depression or anxiety. Denies any recent memory loss.       Historical Information   History reviewed. No pertinent past medical history.  Past Surgical History:   Procedure Laterality Date    ANKLE SURGERY Right 2006     Social History   Social History     Substance and Sexual Activity   Alcohol Use Yes    Comment: SOCALLY     Social History     Substance and Sexual Activity   Drug Use Never     Social History     Tobacco Use   Smoking Status Former    Current packs/day: 0.00    Types: Cigarettes    Quit date:     Years since quittin.9   Smokeless Tobacco Current   Tobacco Comments    VAPES     Family History   Problem Relation Age of Onset    GI  "problems Mother     BERNARDINO disease Maternal Uncle        Meds/Allergies     No current outpatient medications on file.    Allergies   Allergen Reactions    Albuterol Other (See Comments)     CLOSED AIRWAYS, HAD TO GO INTO OXYGEN TENT           Objective     Blood pressure 118/80, pulse 77, height 5' 11\" (1.803 m), weight 61.1 kg (134 lb 12.8 oz), SpO2 99%. Body mass index is 18.8 kg/m².        PHYSICAL EXAM:      General Appearance:   Alert, cooperative, no distress   HEENT:   Normocephalic, atraumatic, anicteric.     Neck:  Supple, symmetrical, trachea midline   Lungs:   Clear to auscultation bilaterally; no rales, rhonchi or wheezing; respirations unlabored    Heart::   Regular rate and rhythm; no murmur, rub, or gallop.   Abdomen:   Soft, non-tender, non-distended; normal bowel sounds; no masses, no organomegaly    Genitalia:   Deferred    Rectal:   Deferred    Extremities:  No cyanosis, clubbing or edema    Pulses:  2+ and symmetric    Skin:  No jaundice, rashes, or lesions    Lymph nodes:  No palpable cervical lymphadenopathy        Lab Results:   No visits with results within 1 Day(s) from this visit.   Latest known visit with results is:   Appointment on 12/29/2021   Component Date Value    Hepatitis C Ab 12/29/2021 Non-reactive     HIV-1/HIV-2 Ab 12/29/2021 Non-Reactive     WBC 12/29/2021 6.49     RBC 12/29/2021 4.78     Hemoglobin 12/29/2021 15.7     Hematocrit 12/29/2021 44.6     MCV 12/29/2021 93     MCH 12/29/2021 32.8     MCHC 12/29/2021 35.2     RDW 12/29/2021 11.2 (L)     MPV 12/29/2021 10.9     Platelets 12/29/2021 287     nRBC 12/29/2021 0     Neutrophils Relative 12/29/2021 69     Immat GRANS % 12/29/2021 0     Lymphocytes Relative 12/29/2021 21     Monocytes Relative 12/29/2021 6     Eosinophils Relative 12/29/2021 3     Basophils Relative 12/29/2021 1     Neutrophils Absolute 12/29/2021 4.50     Immature Grans Absolute 12/29/2021 0.02     Lymphocytes Absolute 12/29/2021 1.37     Monocytes Absolute " 12/29/2021 0.38     Eosinophils Absolute 12/29/2021 0.18     Basophils Absolute 12/29/2021 0.04     Cholesterol 12/29/2021 202 (H)     Triglycerides 12/29/2021 63     HDL, Direct 12/29/2021 80     LDL Calculated 12/29/2021 109 (H)     Non-HDL-Chol (CHOL-HDL) 12/29/2021 122     Vit D, 25-Hydroxy 12/29/2021 25.4 (L)     Sodium 12/29/2021 137     Potassium 12/29/2021 3.8     Chloride 12/29/2021 100     CO2 12/29/2021 30     ANION GAP 12/29/2021 7     BUN 12/29/2021 8     Creatinine 12/29/2021 0.94     Glucose 12/29/2021 126     Calcium 12/29/2021 9.7     AST 12/29/2021 24     ALT 12/29/2021 25     Alkaline Phosphatase 12/29/2021 79     Total Protein 12/29/2021 8.3 (H)     Albumin 12/29/2021 4.5     Total Bilirubin 12/29/2021 0.79     eGFR 12/29/2021 103          Radiology Results:   No results found.

## 2023-12-29 ENCOUNTER — HOSPITAL ENCOUNTER (OUTPATIENT)
Dept: GASTROENTEROLOGY | Facility: HOSPITAL | Age: 38
Setting detail: OUTPATIENT SURGERY
Discharge: HOME | End: 2023-12-29
Payer: COMMERCIAL

## 2023-12-29 ENCOUNTER — ANESTHESIA EVENT (OUTPATIENT)
Dept: GASTROENTEROLOGY | Facility: HOSPITAL | Age: 38
End: 2023-12-29

## 2023-12-29 ENCOUNTER — NURSE TRIAGE (OUTPATIENT)
Age: 38
End: 2023-12-29

## 2023-12-29 ENCOUNTER — ANESTHESIA (OUTPATIENT)
Dept: GASTROENTEROLOGY | Facility: HOSPITAL | Age: 38
End: 2023-12-29

## 2023-12-29 ENCOUNTER — TELEPHONE (OUTPATIENT)
Age: 38
End: 2023-12-29

## 2023-12-29 ENCOUNTER — NURSE TRIAGE (OUTPATIENT)
Dept: OTHER | Facility: OTHER | Age: 38
End: 2023-12-29

## 2023-12-29 VITALS
HEIGHT: 71 IN | RESPIRATION RATE: 18 BRPM | OXYGEN SATURATION: 100 % | WEIGHT: 134.7 LBS | DIASTOLIC BLOOD PRESSURE: 73 MMHG | SYSTOLIC BLOOD PRESSURE: 122 MMHG | TEMPERATURE: 97.7 F | HEART RATE: 65 BPM | BODY MASS INDEX: 18.86 KG/M2

## 2023-12-29 DIAGNOSIS — R13.10 DYSPHAGIA, UNSPECIFIED TYPE: ICD-10-CM

## 2023-12-29 DIAGNOSIS — K20.0 EOSINOPHILIC ESOPHAGITIS: Primary | ICD-10-CM

## 2023-12-29 DIAGNOSIS — R13.10 DYSPHAGIA, UNSPECIFIED TYPE: Primary | ICD-10-CM

## 2023-12-29 DIAGNOSIS — K20.0 EOSINOPHILIC ESOPHAGITIS: ICD-10-CM

## 2023-12-29 PROCEDURE — 88305 TISSUE EXAM BY PATHOLOGIST: CPT | Performed by: PATHOLOGY

## 2023-12-29 PROCEDURE — 43239 EGD BIOPSY SINGLE/MULTIPLE: CPT | Performed by: INTERNAL MEDICINE

## 2023-12-29 RX ORDER — FLUTICASONE PROPIONATE 250 UG/1
2 POWDER, METERED RESPIRATORY (INHALATION) 2 TIMES DAILY
Qty: 360 BLISTER | Refills: 0 | Status: SHIPPED | OUTPATIENT
Start: 2023-12-29 | End: 2024-03-28

## 2023-12-29 RX ORDER — BECLOMETHASONE DIPROPIONATE HFA 40 UG/1
2 AEROSOL, METERED RESPIRATORY (INHALATION) 3 TIMES DAILY
Qty: 10.6 G | Refills: 0 | Status: SHIPPED | OUTPATIENT
Start: 2023-12-29

## 2023-12-29 RX ORDER — LIDOCAINE HYDROCHLORIDE 20 MG/ML
INJECTION, SOLUTION EPIDURAL; INFILTRATION; INTRACAUDAL; PERINEURAL AS NEEDED
Status: DISCONTINUED | OUTPATIENT
Start: 2023-12-29 | End: 2023-12-29

## 2023-12-29 RX ORDER — PROPOFOL 10 MG/ML
INJECTION, EMULSION INTRAVENOUS AS NEEDED
Status: DISCONTINUED | OUTPATIENT
Start: 2023-12-29 | End: 2023-12-29

## 2023-12-29 RX ORDER — OMEPRAZOLE 20 MG/1
20 CAPSULE, DELAYED RELEASE ORAL 2 TIMES DAILY
Qty: 60 CAPSULE | Refills: 4 | Status: SHIPPED | OUTPATIENT
Start: 2023-12-29

## 2023-12-29 RX ORDER — SODIUM CHLORIDE, SODIUM LACTATE, POTASSIUM CHLORIDE, CALCIUM CHLORIDE 600; 310; 30; 20 MG/100ML; MG/100ML; MG/100ML; MG/100ML
INJECTION, SOLUTION INTRAVENOUS CONTINUOUS PRN
Status: DISCONTINUED | OUTPATIENT
Start: 2023-12-29 | End: 2023-12-29

## 2023-12-29 RX ADMIN — SODIUM CHLORIDE, SODIUM LACTATE, POTASSIUM CHLORIDE, AND CALCIUM CHLORIDE: .6; .31; .03; .02 INJECTION, SOLUTION INTRAVENOUS at 09:27

## 2023-12-29 RX ADMIN — SODIUM CHLORIDE, SODIUM LACTATE, POTASSIUM CHLORIDE, AND CALCIUM CHLORIDE: .6; .31; .03; .02 INJECTION, SOLUTION INTRAVENOUS at 10:10

## 2023-12-29 RX ADMIN — LIDOCAINE HYDROCHLORIDE 100 MG: 20 INJECTION, SOLUTION EPIDURAL; INFILTRATION; INTRACAUDAL at 10:30

## 2023-12-29 RX ADMIN — PROPOFOL 150 MG: 10 INJECTION, EMULSION INTRAVENOUS at 10:30

## 2023-12-29 NOTE — ANESTHESIA POSTPROCEDURE EVALUATION
Post-Op Assessment Note    CV Status:  Stable  Pain Score: 0    Pain management: adequate       Mental Status:  Sleepy and arousable   Hydration Status:  Euvolemic   PONV Controlled:  Controlled   Airway Patency:  Patent     Post Op Vitals Reviewed: Yes      Staff: CRNA               /56 (12/29/23 1036)    Temp 97.7 °F (36.5 °C) (12/29/23 1036)    Pulse 62 (12/29/23 1036)   Resp 14 (12/29/23 1036)    SpO2 100 % (12/29/23 1036)

## 2023-12-29 NOTE — INTERVAL H&P NOTE
H&P reviewed. After examining the patient I find no changes in the patients condition since the H&P had been written.    Vitals:    12/29/23 0933   BP: 106/60   Pulse: 69   Resp: 20   Temp: 97.6 °F (36.4 °C)   SpO2: 99%

## 2023-12-29 NOTE — TELEPHONE ENCOUNTER
Patient mother contacted office (on communication consent) questioning recently prescribed medications. Called transferred to nurse triage to further assist.

## 2023-12-29 NOTE — TELEPHONE ENCOUNTER
"LOV 12/27/23 w/Dipti MAGAÑA, EGD 12/9/23 w/      Call transferred to me by Randi.    Pt's mother seeking clarification about Fluticasone Diskus, omeprazole and gaviscon medications on AVS printout. I informed mother that Gaviscon is an OTC medication used to treat GERD. Pt's mother received omeprazole script however all [facility] pharmacies  within a 20 mile radius are out of stock of Fluticasone Diskus. Pt's mother will check alternate pharmacies for medication. I informed her that pt is to rinse mouth with water after using F.Diskus. Pt is without further questions.              Reason for Disposition   Information only question and nurse able to answer    Answer Assessment - Initial Assessment Questions  1. REASON FOR CALL or QUESTION: \"What is your reason for calling today?\" or \"How can I best help you?\" or \"What question do you have that I can help answer?\"      Medication question    Protocols used: Information Only Call - No Triage-ADULT-[place]    "

## 2023-12-29 NOTE — ANESTHESIA PREPROCEDURE EVALUATION
Procedure:  EGD    Relevant Problems   No relevant active problems        Physical Exam    Airway    Mallampati score: I  TM Distance: >3 FB  Neck ROM: full     Dental   No notable dental hx     Cardiovascular      Pulmonary      Other Findings        Anesthesia Plan  ASA Score- 1     Anesthesia Type- IV sedation with anesthesia with ASA Monitors.         Additional Monitors:     Airway Plan:     Comment: I have seen the patient and reviewed the history.  Patient to receive IV sedation with full ASA monitors.  Risks discussed with the patient, consent signed.  I have seen the patient and reviewed the history.  Patient to receive IV sedation with full ASA monitors.  Risks discussed with the patient, consent signed.  .       Plan Factors-Exercise tolerance (METS): >4 METS.    Chart reviewed.    Patient summary reviewed.                  Induction- intravenous.    Postoperative Plan-     Informed Consent- Anesthetic plan and risks discussed with patient.  I personally reviewed this patient with the CRNA. Discussed and agreed on the Anesthesia Plan with the CRNA..

## 2023-12-30 NOTE — TELEPHONE ENCOUNTER
"Reason for Disposition  • [1] Caller has URGENT medicine question about med that PCP or specialist prescribed AND [2] triager unable to answer question    Answer Assessment - Initial Assessment Questions  1. NAME of MEDICATION: \"What medicine are you calling about?\"      Flovent Diskus     2. QUESTION: \"What is your question?\" (e.g., medication refill, side effect)      \"The medication is not covered by my sons insurance, is there an alternative?\"    3. PRESCRIBING HCP: \"Who prescribed it?\" Reason: if prescribed by specialist, call should be referred to that group.      Dr. Jignesh Mayberry    Protocols used: Medication Question Call-ADULT-[facility]    "

## 2023-12-30 NOTE — TELEPHONE ENCOUNTER
"Regarding: Medication problem  ----- Message from Adrienne Montoya sent at 12/29/2023  8:08 PM EST -----  Mom called w/patient: \"my son had a procedure with Dr. Mayberry today and they gave him 2 prescriptions. The pharmacy is saying the flovent diskus is not covered and we cannot afford to pay for it out of pocket. Does he need this medication or is there an alternative?\"    "

## 2024-01-02 PROCEDURE — 88305 TISSUE EXAM BY PATHOLOGIST: CPT | Performed by: PATHOLOGY

## 2024-01-04 ENCOUNTER — PREP FOR PROCEDURE (OUTPATIENT)
Dept: GASTROENTEROLOGY | Facility: CLINIC | Age: 39
End: 2024-01-04

## 2024-01-04 DIAGNOSIS — K20.0 EOSINOPHILIC ESOPHAGITIS: Primary | ICD-10-CM

## 2024-01-04 DIAGNOSIS — R13.10 DYSPHAGIA, UNSPECIFIED TYPE: ICD-10-CM

## 2024-01-20 DIAGNOSIS — R13.10 DYSPHAGIA, UNSPECIFIED TYPE: ICD-10-CM

## 2024-01-20 DIAGNOSIS — K20.0 EOSINOPHILIC ESOPHAGITIS: ICD-10-CM

## 2024-01-22 RX ORDER — OMEPRAZOLE 20 MG/1
20 CAPSULE, DELAYED RELEASE ORAL 2 TIMES DAILY
Qty: 180 CAPSULE | Refills: 1 | Status: SHIPPED | OUTPATIENT
Start: 2024-01-22

## 2024-02-02 ENCOUNTER — TELEPHONE (OUTPATIENT)
Dept: GASTROENTEROLOGY | Facility: CLINIC | Age: 39
End: 2024-02-02

## 2024-02-14 ENCOUNTER — ANESTHESIA EVENT (OUTPATIENT)
Dept: GASTROENTEROLOGY | Facility: HOSPITAL | Age: 39
End: 2024-02-14

## 2024-02-14 ENCOUNTER — ANESTHESIA (OUTPATIENT)
Dept: GASTROENTEROLOGY | Facility: HOSPITAL | Age: 39
End: 2024-02-14

## 2024-02-14 ENCOUNTER — HOSPITAL ENCOUNTER (OUTPATIENT)
Dept: GASTROENTEROLOGY | Facility: HOSPITAL | Age: 39
Setting detail: OUTPATIENT SURGERY
Discharge: HOME | End: 2024-02-14
Attending: INTERNAL MEDICINE
Payer: COMMERCIAL

## 2024-02-14 VITALS
HEART RATE: 77 BPM | RESPIRATION RATE: 20 BRPM | WEIGHT: 141.98 LBS | TEMPERATURE: 97.2 F | BODY MASS INDEX: 19.88 KG/M2 | SYSTOLIC BLOOD PRESSURE: 111 MMHG | DIASTOLIC BLOOD PRESSURE: 74 MMHG | OXYGEN SATURATION: 99 % | HEIGHT: 71 IN

## 2024-02-14 DIAGNOSIS — R13.10 DYSPHAGIA, UNSPECIFIED TYPE: ICD-10-CM

## 2024-02-14 DIAGNOSIS — K20.0 EOSINOPHILIC ESOPHAGITIS: ICD-10-CM

## 2024-02-14 PROCEDURE — 88305 TISSUE EXAM BY PATHOLOGIST: CPT | Performed by: PATHOLOGY

## 2024-02-14 PROCEDURE — 43239 EGD BIOPSY SINGLE/MULTIPLE: CPT | Performed by: INTERNAL MEDICINE

## 2024-02-14 RX ORDER — SODIUM CHLORIDE, SODIUM LACTATE, POTASSIUM CHLORIDE, CALCIUM CHLORIDE 600; 310; 30; 20 MG/100ML; MG/100ML; MG/100ML; MG/100ML
INJECTION, SOLUTION INTRAVENOUS CONTINUOUS PRN
Status: DISCONTINUED | OUTPATIENT
Start: 2024-02-14 | End: 2024-02-14

## 2024-02-14 RX ORDER — PROPOFOL 10 MG/ML
INJECTION, EMULSION INTRAVENOUS AS NEEDED
Status: DISCONTINUED | OUTPATIENT
Start: 2024-02-14 | End: 2024-02-14

## 2024-02-14 RX ORDER — PREDNISONE 10 MG/1
10 TABLET ORAL DAILY
Qty: 100 TABLET | Refills: 0 | Status: SHIPPED | OUTPATIENT
Start: 2024-02-14

## 2024-02-14 RX ORDER — BECLOMETHASONE DIPROPIONATE HFA 40 UG/1
2 AEROSOL, METERED RESPIRATORY (INHALATION) 2 TIMES DAILY
Qty: 31.8 G | Refills: 2 | Status: SHIPPED | OUTPATIENT
Start: 2024-02-14

## 2024-02-14 RX ORDER — LIDOCAINE HYDROCHLORIDE 20 MG/ML
INJECTION, SOLUTION EPIDURAL; INFILTRATION; INTRACAUDAL; PERINEURAL AS NEEDED
Status: DISCONTINUED | OUTPATIENT
Start: 2024-02-14 | End: 2024-02-14

## 2024-02-14 RX ADMIN — LIDOCAINE HYDROCHLORIDE 80 MG: 20 INJECTION, SOLUTION EPIDURAL; INFILTRATION; INTRACAUDAL; PERINEURAL at 11:02

## 2024-02-14 RX ADMIN — PROPOFOL 150 MG: 10 INJECTION, EMULSION INTRAVENOUS at 11:02

## 2024-02-14 RX ADMIN — SODIUM CHLORIDE, SODIUM LACTATE, POTASSIUM CHLORIDE, AND CALCIUM CHLORIDE: .6; .31; .03; .02 INJECTION, SOLUTION INTRAVENOUS at 10:26

## 2024-02-14 NOTE — ANESTHESIA POSTPROCEDURE EVALUATION
Post-Op Assessment Note    CV Status:  Stable    Pain management: adequate       Mental Status:  Alert and awake   Hydration Status:  Euvolemic   PONV Controlled:  Controlled   Airway Patency:  Patent     Post Op Vitals Reviewed: Yes    No anethesia notable event occurred.    Staff: CRNA               BP      Temp      Pulse     Resp      SpO2

## 2024-02-14 NOTE — H&P
"History and Physical -  [facility]  Cruzito Moon 39 y.o. male MRN: [id number]                  HPI: Cruzito Moon is a 39 y.o. year old male who presents for endoscopy for evaluation of dysphagia and eosinophilic esophagitis      REVIEW OF SYSTEMS: Per the HPI, and otherwise unremarkable.    Historical Information   History reviewed. No pertinent past medical history.  Past Surgical History:   Procedure Laterality Date    ANKLE SURGERY Right 2006     Social History   Social History     Substance and Sexual Activity   Alcohol Use Yes    Comment: SOCALLY     Social History     Substance and Sexual Activity   Drug Use Never     Social History     Tobacco Use   Smoking Status Former    Current packs/day: 0.00    Types: Cigarettes    Quit date:     Years since quittin.1   Smokeless Tobacco Current   Tobacco Comments    VAPES     Family History   Problem Relation Age of Onset    GI problems Mother     BERNARDINO disease Maternal Uncle        Meds/Allergies     (Not in a hospital admission)      Allergies   Allergen Reactions    Albuterol Other (See Comments)     CLOSED AIRWAYS, HAD TO GO INTO OXYGEN TENT       Objective     Blood pressure 120/70, pulse 75, temperature (!) 97 °F (36.1 °C), temperature source Temporal, resp. rate 14, height 5' 11\" (1.803 m), weight 64.4 kg (141 lb 15.6 oz), SpO2 99%.      PHYSICAL EXAM    /70   Pulse 75   Temp (!) 97 °F (36.1 °C) (Temporal)   Resp 14   Ht 5' 11\" (1.803 m)   Wt 64.4 kg (141 lb 15.6 oz)   SpO2 99%   BMI 19.80 kg/m²       Gen: NAD  CV: RRR  CHEST: Clear  ABD: soft, NT/ND  EXT: no edema      ASSESSMENT/PLAN:  This is a 39 y.o. year old male here for endoscopy, and he is stable and optimized for his procedure.        "

## 2024-02-14 NOTE — ANESTHESIA PREPROCEDURE EVALUATION
Procedure:  EGD    Relevant Problems   No relevant active problems        Physical Exam    Airway    Mallampati score: II  TM Distance: >3 FB  Neck ROM: full     Dental       Cardiovascular  Cardiovascular exam normal    Pulmonary  Pulmonary exam normal     Other Findings        Anesthesia Plan  ASA Score- 1     Anesthesia Type- IV sedation with anesthesia with ASA Monitors.         Additional Monitors:     Airway Plan:            Plan Factors-Exercise tolerance (METS): >4 METS.    Chart reviewed. EKG reviewed. Imaging results reviewed. Existing labs reviewed. Patient summary reviewed.                  Induction- intravenous.    Postoperative Plan-     Informed Consent- Anesthetic plan and risks discussed with patient.  I personally reviewed this patient with the CRNA. Discussed and agreed on the Anesthesia Plan with the CRNA..

## 2024-02-15 ENCOUNTER — TELEPHONE (OUTPATIENT)
Dept: GASTROENTEROLOGY | Facility: CLINIC | Age: 39
End: 2024-02-15

## 2024-02-15 ENCOUNTER — PREP FOR PROCEDURE (OUTPATIENT)
Dept: GASTROENTEROLOGY | Facility: CLINIC | Age: 39
End: 2024-02-15

## 2024-02-15 DIAGNOSIS — K20.0 EOSINOPHILIC ESOPHAGITIS: ICD-10-CM

## 2024-02-15 DIAGNOSIS — R13.10 DYSPHAGIA, UNSPECIFIED TYPE: ICD-10-CM

## 2024-02-15 DIAGNOSIS — K20.0 EOSINOPHILIC ESOPHAGITIS: Primary | ICD-10-CM

## 2024-02-15 RX ORDER — OMEPRAZOLE 20 MG/1
20 CAPSULE, DELAYED RELEASE ORAL 2 TIMES DAILY
Qty: 180 CAPSULE | Refills: 1 | Status: SHIPPED | OUTPATIENT
Start: 2024-02-15

## 2024-02-15 NOTE — TELEPHONE ENCOUNTER
Spoke with the patient and scheduled his repeat EGD with dilation. Prep instructions discussed verbally, patient still has instructions from previous EGD.    Scheduled date of EGD(as of today):3/29/24  Physician performing EGD:Jefe  Location of EGD:[facility]  Instructions reviewed with patient by:Shari BURGOS  Clearances:  none

## 2024-02-16 PROCEDURE — 88305 TISSUE EXAM BY PATHOLOGIST: CPT | Performed by: PATHOLOGY

## 2024-02-29 DIAGNOSIS — K20.0 EOSINOPHILIC ESOPHAGITIS: ICD-10-CM

## 2024-02-29 DIAGNOSIS — R13.10 DYSPHAGIA, UNSPECIFIED TYPE: ICD-10-CM

## 2024-02-29 RX ORDER — OMEPRAZOLE 20 MG/1
20 CAPSULE, DELAYED RELEASE ORAL 2 TIMES DAILY
Qty: 180 CAPSULE | Refills: 0 | Status: SHIPPED | OUTPATIENT
Start: 2024-02-29 | End: 2024-05-29

## 2024-03-18 ENCOUNTER — TELEPHONE (OUTPATIENT)
Dept: GASTROENTEROLOGY | Facility: CLINIC | Age: 39
End: 2024-03-18

## 2024-03-26 ENCOUNTER — TELEPHONE (OUTPATIENT)
Dept: GASTROENTEROLOGY | Facility: CLINIC | Age: 39
End: 2024-03-26

## 2024-03-26 NOTE — TELEPHONE ENCOUNTER
Pt called to r/s EGD with Dr. Mayberry. Egd r/s to 4/19 with Dr. Mayberry in [facility].

## 2024-04-19 ENCOUNTER — ANESTHESIA (OUTPATIENT)
Dept: GASTROENTEROLOGY | Facility: HOSPITAL | Age: 39
End: 2024-04-19

## 2024-04-19 ENCOUNTER — HOSPITAL ENCOUNTER (OUTPATIENT)
Dept: GASTROENTEROLOGY | Facility: HOSPITAL | Age: 39
Setting detail: OUTPATIENT SURGERY
Discharge: HOME | End: 2024-04-19
Attending: INTERNAL MEDICINE
Payer: COMMERCIAL

## 2024-04-19 ENCOUNTER — ANESTHESIA EVENT (OUTPATIENT)
Dept: GASTROENTEROLOGY | Facility: HOSPITAL | Age: 39
End: 2024-04-19

## 2024-04-19 VITALS
BODY MASS INDEX: 20.9 KG/M2 | OXYGEN SATURATION: 98 % | HEIGHT: 71 IN | SYSTOLIC BLOOD PRESSURE: 103 MMHG | WEIGHT: 149.25 LBS | TEMPERATURE: 97.8 F | HEART RATE: 69 BPM | DIASTOLIC BLOOD PRESSURE: 64 MMHG | RESPIRATION RATE: 14 BRPM

## 2024-04-19 DIAGNOSIS — K20.0 EOSINOPHILIC ESOPHAGITIS: ICD-10-CM

## 2024-04-19 DIAGNOSIS — R13.10 DYSPHAGIA, UNSPECIFIED TYPE: ICD-10-CM

## 2024-04-19 PROCEDURE — 43249 ESOPH EGD DILATION <30 MM: CPT | Performed by: INTERNAL MEDICINE

## 2024-04-19 PROCEDURE — 43239 EGD BIOPSY SINGLE/MULTIPLE: CPT | Performed by: INTERNAL MEDICINE

## 2024-04-19 PROCEDURE — 88305 TISSUE EXAM BY PATHOLOGIST: CPT | Performed by: PATHOLOGY

## 2024-04-19 PROCEDURE — C1726 CATH, BAL DIL, NON-VASCULAR: HCPCS

## 2024-04-19 RX ORDER — OMEPRAZOLE 20 MG/1
20 CAPSULE, DELAYED RELEASE ORAL DAILY
Qty: 90 CAPSULE | Refills: 4 | Status: SHIPPED | OUTPATIENT
Start: 2024-04-19 | End: 2024-07-18

## 2024-04-19 RX ORDER — PROPOFOL 10 MG/ML
INJECTION, EMULSION INTRAVENOUS AS NEEDED
Status: DISCONTINUED | OUTPATIENT
Start: 2024-04-19 | End: 2024-04-19

## 2024-04-19 RX ORDER — LIDOCAINE HYDROCHLORIDE 20 MG/ML
INJECTION, SOLUTION EPIDURAL; INFILTRATION; INTRACAUDAL; PERINEURAL AS NEEDED
Status: DISCONTINUED | OUTPATIENT
Start: 2024-04-19 | End: 2024-04-19

## 2024-04-19 RX ORDER — SODIUM CHLORIDE, SODIUM LACTATE, POTASSIUM CHLORIDE, CALCIUM CHLORIDE 600; 310; 30; 20 MG/100ML; MG/100ML; MG/100ML; MG/100ML
INJECTION, SOLUTION INTRAVENOUS CONTINUOUS PRN
Status: DISCONTINUED | OUTPATIENT
Start: 2024-04-19 | End: 2024-04-19

## 2024-04-19 RX ADMIN — PROPOFOL 50 MG: 10 INJECTION, EMULSION INTRAVENOUS at 12:00

## 2024-04-19 RX ADMIN — PROPOFOL 200 MG: 10 INJECTION, EMULSION INTRAVENOUS at 11:54

## 2024-04-19 RX ADMIN — LIDOCAINE HYDROCHLORIDE 5 ML: 20 INJECTION, SOLUTION EPIDURAL; INFILTRATION; INTRACAUDAL; PERINEURAL at 11:53

## 2024-04-19 RX ADMIN — SODIUM CHLORIDE, SODIUM LACTATE, POTASSIUM CHLORIDE, AND CALCIUM CHLORIDE: .6; .31; .03; .02 INJECTION, SOLUTION INTRAVENOUS at 11:10

## 2024-04-19 RX ADMIN — PROPOFOL 50 MG: 10 INJECTION, EMULSION INTRAVENOUS at 11:57

## 2024-04-19 NOTE — H&P
"History and Physical - [facility]  Cruzito Moon 39 y.o. male MRN: [id number]                  HPI: Cruzito Moon is a 39 y.o. year old male who presents for endoscopy with dilation for severe dysphagia with stricture from EOE      REVIEW OF SYSTEMS: Per the HPI, and otherwise unremarkable.    Historical Information   Past Medical History:   Diagnosis Date    GERD (gastroesophageal reflux disease)      Past Surgical History:   Procedure Laterality Date    ANKLE SURGERY Right 2006    EGD       Social History   Social History     Substance and Sexual Activity   Alcohol Use Yes    Alcohol/week: 6.0 standard drinks of alcohol    Types: 6 Cans of beer per week     Social History     Substance and Sexual Activity   Drug Use Never     Social History     Tobacco Use   Smoking Status Former    Current packs/day: 0.00    Types: Cigarettes    Quit date:     Years since quittin.3   Smokeless Tobacco Current   Tobacco Comments    VAPES     Family History   Problem Relation Age of Onset    GI problems Mother     BERNARDINO disease Maternal Uncle        Meds/Allergies     (Not in a hospital admission)      Allergies   Allergen Reactions    Albuterol Other (See Comments)     CLOSED AIRWAYS, HAD TO GO INTO OXYGEN TENT       Objective     Blood pressure 115/73, pulse 77, temperature 97.5 °F (36.4 °C), temperature source Temporal, resp. rate 15, height 5' 11\" (1.803 m), weight 67.7 kg (149 lb 4 oz), SpO2 99%.      PHYSICAL EXAM    /73   Pulse 77   Temp 97.5 °F (36.4 °C) (Temporal)   Resp 15   Ht 5' 11\" (1.803 m)   Wt 67.7 kg (149 lb 4 oz)   SpO2 99%   BMI 20.82 kg/m²       Gen: NAD  CV: RRR  CHEST: Clear  ABD: soft, NT/ND  EXT: no edema      ASSESSMENT/PLAN:  This is a 39 y.o. year old male here for egd, and he is stable and optimized for his procedure.        "

## 2024-04-19 NOTE — ANESTHESIA PREPROCEDURE EVALUATION
Procedure:  EGD    Relevant Problems   No relevant active problems        Physical Exam    Airway    Mallampati score: I  TM Distance: >3 FB  Neck ROM: full     Dental   No notable dental hx     Cardiovascular  Rhythm: regular, Rate: normal, Cardiovascular exam normal    Pulmonary  Pulmonary exam normal Breath sounds clear to auscultation    Other Findings        Anesthesia Plan  ASA Score- 1     Anesthesia Type- IV sedation with anesthesia with ASA Monitors.         Additional Monitors:     Airway Plan:            Plan Factors-Exercise tolerance (METS): >4 METS.    Chart reviewed. EKG reviewed. Imaging results reviewed. Existing labs reviewed. Patient summary reviewed.    Patient is not a current smoker.  Patient did not smoke on day of surgery.            Induction- intravenous.    Postoperative Plan-     Informed Consent- Anesthetic plan and risks discussed with patient.  I personally reviewed this patient with the CRNA. Discussed and agreed on the Anesthesia Plan with the CRNA..

## 2024-04-19 NOTE — ANESTHESIA POSTPROCEDURE EVALUATION
Post-Op Assessment Note    CV Status:  Stable  Pain Score: 0    Pain management: adequate       Mental Status:  Sleepy   PONV Controlled:  Controlled     Post Op Vitals Reviewed: Yes    No anethesia notable event occurred.                BP      Temp      Pulse     Resp      SpO2         None

## 2024-04-22 PROCEDURE — 88305 TISSUE EXAM BY PATHOLOGIST: CPT | Performed by: PATHOLOGY

## 2025-04-23 DIAGNOSIS — K20.0 EOSINOPHILIC ESOPHAGITIS: ICD-10-CM

## 2025-04-23 DIAGNOSIS — R13.10 DYSPHAGIA, UNSPECIFIED TYPE: ICD-10-CM

## 2025-04-23 RX ORDER — OMEPRAZOLE 20 MG/1
20 CAPSULE, DELAYED RELEASE ORAL DAILY
Qty: 90 CAPSULE | Refills: 4 | Status: SHIPPED | OUTPATIENT
Start: 2025-04-23